# Patient Record
Sex: MALE | Race: WHITE | Employment: OTHER | URBAN - METROPOLITAN AREA
[De-identification: names, ages, dates, MRNs, and addresses within clinical notes are randomized per-mention and may not be internally consistent; named-entity substitution may affect disease eponyms.]

---

## 2020-11-24 ENCOUNTER — OFFICE VISIT (OUTPATIENT)
Dept: ORTHOPEDIC SURGERY | Age: 85
End: 2020-11-24
Payer: MEDICARE

## 2020-11-24 VITALS — WEIGHT: 176 LBS | HEIGHT: 70 IN | BODY MASS INDEX: 25.2 KG/M2

## 2020-11-24 PROCEDURE — 99203 OFFICE O/P NEW LOW 30 MIN: CPT | Performed by: PHYSICIAN ASSISTANT

## 2020-11-24 PROCEDURE — G8417 CALC BMI ABV UP PARAM F/U: HCPCS | Performed by: PHYSICIAN ASSISTANT

## 2020-11-24 PROCEDURE — 1123F ACP DISCUSS/DSCN MKR DOCD: CPT | Performed by: PHYSICIAN ASSISTANT

## 2020-11-24 PROCEDURE — 4040F PNEUMOC VAC/ADMIN/RCVD: CPT | Performed by: PHYSICIAN ASSISTANT

## 2020-11-24 PROCEDURE — G8427 DOCREV CUR MEDS BY ELIG CLIN: HCPCS | Performed by: PHYSICIAN ASSISTANT

## 2020-11-24 PROCEDURE — 1036F TOBACCO NON-USER: CPT | Performed by: PHYSICIAN ASSISTANT

## 2020-11-24 PROCEDURE — G8484 FLU IMMUNIZE NO ADMIN: HCPCS | Performed by: PHYSICIAN ASSISTANT

## 2020-11-24 NOTE — PROGRESS NOTES
NEW PATIENT VISIT      Referring Provider: [facility]    Primary Care Provider: Dr. Ashleigh Sands MD    CHIEF COMPLAINT    Left shoulder dislocation    HISTORY OF PRESENT ILLNESS    John Sullivan is a 80 y.o. male who presents for left shoulder dislocation. He comes to the office today with his caregiver from [facility]. He has Alzheimer's and is very confused. Not a good historian. He and his caregiver are unsure if he has had any recent falls. Upon calling the nursing home facility he apparently did have a fall in September. He reports that his shoulder is in pain. He comes in today with in the office in a wheelchair and wearing a sling. His caregiver reports that he does lean and slumped to that left side frequently. ROS    Review of Systems:  Francine Nguyen's review of systems has been performed by intake and observation. All past and current ROS forms have been scanned into the medical record. He has been instructed to contact his primary care provider regarding ROS issues if not already being addressed at this time. There are no recent changes.  The most recent ROS was scanned into media on 11/24/2020      PAST SURGICAL HISTORY    Past Surgical History:   Procedure Laterality Date    APPENDECTOMY      CAROTID ENDARTERECTOMY      COLONOSCOPY  2014    bx    OTHER SURGICAL HISTORY      FEMORAL STENT    NC OPEN CORONARY ENDARTERECTOMY      Angioplasty    TONSILLECTOMY      UPPER GASTROINTESTINAL ENDOSCOPY      bx       PAST MEDICAL    Past Medical History:   Diagnosis Date    [facility]     CAD (coronary artery disease)     Hyperlipidemia     Hypertension     [facility]ocardial infarction) [facility]     Parkinson's disease ([facility] 2014    Unspecified cerebral artery occlusion with cerebral infarction        ALLERGIES    No Known Allergies    MEDS    Current Outpatient Medications   Medication Sig Dispense Refill    lisinopril (PRINIVIL;ZESTRIL) 5 MG tablet Take 5 mg by mouth daily      metoprolol tartrate (LOPRESSOR) 25 MG tablet Take 25 mg by mouth 2 times daily      rivastigmine (EXELON) 3 MG capsule Take 3 mg by mouth 2 times daily      potassium chloride (MICRO-K) 10 MEQ extended release capsule Take 10 mEq by mouth daily      Saline (SIMPLY SALINE) 0.9 % AERS by Nasal route      oxybutynin (DITROPAN) 5 MG tablet Take 5 mg by mouth daily       tamsulosin (FLOMAX) 0.4 MG capsule       furosemide (LASIX) 20 MG tablet 20 mg 2 times daily       atorvastatin (LIPITOR) 20 MG tablet Take 40 mg by mouth daily       aspirin 81 MG chewable tablet Take 81 mg by mouth daily.  diphenhydrAMINE (ALLERGY RELIEF) 25 MG capsule Take 25 mg by mouth every 6 hours as needed for Itching.  IRON PO Take 65 mg by mouth 2 times daily.  Multiple Vitamin (MULTIVITAMIN PO) Take  by mouth.  GLUCOSAMINE-CHONDROITIN PO Take 2 tablets by mouth daily. No current facility-administered medications for this visit.         SOCIAL    Social History     Socioeconomic History    Marital status:      Spouse name: Not on file    Number of children: Not on file    Years of education: Not on file    Highest education level: Not on file   Occupational History    Not on file   Social Needs    Financial resource strain: Not on file    Food insecurity     Worry: Not on file     Inability: Not on file    Transportation needs     Medical: Not on file     Non-medical: Not on file   Tobacco Use    Smoking status: Former Smoker     Last attempt to quit: 10/1/1985     Years since quittin.1    Smokeless tobacco: Never Used   Substance and Sexual Activity    Alcohol use: No    Drug use: No    Sexual activity: Yes     Partners: Female   Lifestyle    Physical activity     Days per week: Not on file     Minutes per session: Not on file    Stress: Not on file   Relationships    Social connections     Talks on phone: Not on file Gets together: Not on file     Attends Latter day service: Not on file     Active member of club or organization: Not on file     Attends meetings of clubs or organizations: Not on file     Relationship status: Not on file    Intimate partner violence     Fear of current or ex partner: Not on file     Emotionally abused: Not on file     Physically abused: Not on file     Forced sexual activity: Not on file   Other Topics Concern    Not on file   Social History Narrative    Not on file       FAMILY HISTORY    Family History   Problem Relation Age of Onset    Heart Disease Father        PHYSICAL EXAM    Ht 5' 10\" (1.778 m)   Wt 176 lb (79.8 kg)   BMI 25.25 kg/m² [email or web address]   General:  Patient is alert and orientation to person place and time is age-appropriate. Gait:  Patient walks around the room and in the hallway with a normal gait and no limp. Balance and coordination are age-appropriate. Extremities: Anterior bulge is noted. Hand wrist and elbow appear to be in normal working condition. Shoulder range of motion was not tested today. Patient does have swelling noted throughout the shoulder. Skin:  Normal on back and bilateral upper and lower extremities. Spine:  No deformity. Neurological:  Normal motor and sensory exam in both upper and lower extremities. Vascular exam:  Normal in both upper and lower extremities. IMAGING STUDIES    2 views of the left shoulder show an anteriorly displaced shoulder. Office Procedures:  No orders of the defined types were placed in this encounter. IMPRESSION    Left shoulder anterior dislocation with likely large rotator cuff insufficiency     PLAN    At this time, we will plan on reaching out to his wife to discuss further treatment plans. He is not in the right mind to make those decisions today and his caregiver does not have that authority.               [facility]  A Partner of [facility] ([facility])      This

## 2020-12-01 ENCOUNTER — TELEPHONE (OUTPATIENT)
Dept: ORTHOPEDIC SURGERY | Age: 85
End: 2020-12-01

## 2020-12-01 NOTE — TELEPHONE ENCOUNTER
I thank you for checking on them. It sounds as if there are no complications. For that reason, it may be safest and best to continue with nonsurgical treatment. Perhaps, we can check in with them again in another 2 weeks to be sure he is stable and the family and care team are on board with that plan.

## 2020-12-01 NOTE — TELEPHONE ENCOUNTER
Spoke with Trae Prince at [facility]. She said patient was doing well, taking norco 1-2 times per day for pain. He is eating and drinking well with normal BM's. He is in his wheelchair currently and will be transferred to his recliner between meals. She also said he has been wearing his sling and doesn't have any other new changes. She states he does have limited ROM and OT  Is wanting to know how much ROM they can do. She also mentioned speaking to patients wife who said he had hip surgery 2+ years ago and his dementia declined greatly after that surgery and is concerned about getting his shoulder fixed for that reason.

## 2020-12-14 ENCOUNTER — TELEPHONE (OUTPATIENT)
Dept: ORTHOPEDIC SURGERY | Age: 85
End: 2020-12-14

## 2020-12-14 NOTE — TELEPHONE ENCOUNTER
Patients daughter Bharati Hernandez called to ask questions about Speedy's plan of care. I set up a time for Dr. Alyssa Tinoco to speak directly with Bharati Hernandez regarding his care. Called patients wife Sebastian Camacho, who is also patients power of  to make sure it was ok that Dr. Alyssa Tinoco speak to Bharati Hernandez, as we don't have a current HIPAA form on file. Sebastian Camacho said it was ok for Dr. Alyssa Tinoco to speak with daughter Bharati Hernandez. No further questions.

## 2020-12-14 NOTE — TELEPHONE ENCOUNTER
General Question     Subject: FATHER'S LT SHOULDER  Patient and /or Facility Request: Mitchell Nunez  Contact Number: [phone]

## 2020-12-15 ENCOUNTER — VIRTUAL VISIT (OUTPATIENT)
Dept: ORTHOPEDIC SURGERY | Age: 85
End: 2020-12-15

## 2020-12-15 PROCEDURE — 99999 PR OFFICE/OUTPT VISIT,PROCEDURE ONLY: CPT | Performed by: ORTHOPAEDIC SURGERY

## 2020-12-15 NOTE — PROGRESS NOTES
Telephone consultation at [facility] on December 15 with Fidel Puentes Mr. Vesta Corbett daughter. She had obtained permission from her mother, the power of , to have this consultation. We discussed the difficulty of this clinical scenario in which this 66-year-old patient with a history of cardiac disease and advanced dementia presented to us with a chronic, time undetermined locked anterior glenohumeral dislocation. Family had been extremely concerned about the possible risks and sequela of surgery. He appeared reasonably comfortable at the time with no swelling, ecchymosis or nerve changes. He had movement of the elbow and hand. The daughter, Fidel Puentes, reports that the patient does have pain with bathing and hygiene as well as getting dressed. She feels this has been affecting his quality of life activity as he is already wheelchair confined. We discussed potential reconstructive surgery. I feel given the chronicity of this location, the soft tissue insufficiency that the only reliable treatment would be a retentive reverse prosthesis. We discussed some literature regarding outcomes and complications rates. I was very open about the potential of cardiac complications or worsening of dementia.   Fidel Puentes, her siblings and her mother agreed to have a family meeting and discuss this at length to make a decision regarding conservative versus surgical intervention

## 2020-12-29 ENCOUNTER — OFFICE VISIT (OUTPATIENT)
Dept: ORTHOPEDIC SURGERY | Age: 85
End: 2020-12-29
Payer: MEDICARE

## 2020-12-29 VITALS — WEIGHT: 176 LBS | BODY MASS INDEX: 25.2 KG/M2 | HEIGHT: 70 IN

## 2020-12-29 PROCEDURE — G8427 DOCREV CUR MEDS BY ELIG CLIN: HCPCS | Performed by: ORTHOPAEDIC SURGERY

## 2020-12-29 PROCEDURE — 4040F PNEUMOC VAC/ADMIN/RCVD: CPT | Performed by: ORTHOPAEDIC SURGERY

## 2020-12-29 PROCEDURE — 99214 OFFICE O/P EST MOD 30 MIN: CPT | Performed by: ORTHOPAEDIC SURGERY

## 2020-12-29 PROCEDURE — 1036F TOBACCO NON-USER: CPT | Performed by: ORTHOPAEDIC SURGERY

## 2020-12-29 PROCEDURE — 1123F ACP DISCUSS/DSCN MKR DOCD: CPT | Performed by: ORTHOPAEDIC SURGERY

## 2020-12-29 PROCEDURE — L3670 SO ACRO/CLAV CAN WEB PRE OTS: HCPCS | Performed by: ORTHOPAEDIC SURGERY

## 2020-12-29 PROCEDURE — G8484 FLU IMMUNIZE NO ADMIN: HCPCS | Performed by: ORTHOPAEDIC SURGERY

## 2020-12-29 PROCEDURE — G8417 CALC BMI ABV UP PARAM F/U: HCPCS | Performed by: ORTHOPAEDIC SURGERY

## 2020-12-29 RX ORDER — BENZOYL PEROXIDE 50 MG/ML
LIQUID TOPICAL
Qty: 1 BOTTLE | Refills: 0 | Status: SHIPPED | OUTPATIENT
Start: 2020-12-29

## 2020-12-29 NOTE — PROGRESS NOTES
Department of Orthopedic Surgery   Progress Note      Follow-Up: Chronic locked anterior left shoulder dislocation    Subjective:     Breanna Rasmussen 80year-old gentleman with unfortunately some recent advancing dementia. He had presented previously with a chronic locked anterior dislocation. Unclear etiology at the nursing facility. He did have underlying rotator cuff arthropathy please posing him to this. We had multiple visits with both Speedy's wife as well as his family by telephone. They have been trialing conservative treatment with prescription disability however quite compromised by persistent pain. He seems uncomfortable has been requesting surgery. He has not seen his primary care doctor in over a year. He has a relevant history of a coronary artery event by his wife's report      Objective:     @IPVITALS(24)@    Review of Systems  Pertinent items are noted in HPI  Denies fever, chills, confusion, bowel/bladder active change. Review of systems reviewed from Patient History Form dated on December 29 and available in the patient's chart under the Media tab. Exam: On exam today he is seated in his wheelchair. He is clearly uncomfortable with any attempted movement of the left upper extremity. He has some mild lymphedema in the forearm but remains a supple hand. He has the soft tissue deformity anteriorly with tenting of the anterior deltoid. Imaging: Reviewed    Office Procedures:      Assessment:     Locked anterior glenohumeral dislocation 49-year-old gentleman in the setting of rotator cuff arthropathy and some moderate dementia.      Plan:

## 2020-12-29 NOTE — LETTER
Surgery Scheduling Form   [x] [phone]   [] [facility] Fax# [phone]  [] [facility] Fax# [phone]    Scheduled Date: 21      Scheduled Time: 7:30am      Time Needed: 90min        Patient Information:      Name: Beth Mcleod      YOB: 1935      SSN:         Gender:  male                            Address: [address] [place] [address]   Phone Number: [phone] (home) [phone] (work)    Insurance:  Payor: MEDICARE / Plan: MEDICARE PART A AND B / Product Type: *No Product type* /     Primary Care Physician:  Korina Sexton MD    H&P To Be Completed By: PCP    Cardiac Clearance Needed? [x] Yes [] No        Pacemaker/Cardiac Implant? [] Yes [x] No            Surgical Procedure: Left reverse total shoulder arthroplasty   CPT [id number]    Pre- Op Diagnosis:   1. Rotator cuff arthropathy of left shoulder    2. Dislocation of left shoulder joint, initial encounter            Diagnosis Code: M12.812, S43.005A    Rep: [facility]      Notified:  [x] Yes [] No        Special Equipment: [address] chair      [] Mini C-ARM   [] Large  C-ARM       Patient Status:   [] Outpatient         [x] Same Day Admission      [] In- Patient              Anesthesia Type:    [x] General         [] MAC       [] IV Regional          [] Local          [x] Interscalene block    [] Axillary Block             [] Popliteal Block     Allergies:  No Known Allergies      Latex: [] Yes [x] No      Pre-Admission Testing  Total Joint Orders for Manuela Rdoriguez M.D   NAME  Beth Mcleod               1935      Vitals:    20 1126   Weight: 176 lb (79.8 kg)   Height: 5' 10\" (1.778 m)          Edwina [address]. ALL OTHER ORDERS WILL BE AUTOMATICALLY INITIATED.         1. [x]  Attend PAT 1-3 weeks prior to OR date            [x]  Attend Joint Class prior to surgery (If applicable) 2. PT/ OT evaluation    3. MEDICATIONS:  Identify medications to take or hold day of surgery    4. DIAGNOSTICS:   ** note: if these have been done within 90 days, they do not have to be repeated**  [x]  APTT    [x] PT/INR    [x] BMP    [x] Albumin    [x] Type & Screen         [x] Hgb A1    [x]  CBC w/ diff    [x] Transferrin Level    [x] EKG (60 or older, or cardiac history)    [x] UA/urine culture and sensitivity       5 EDUCATION:     [x]Instruct patient to wash with Benzoyl Peroxide wash. Wash 2 days prior, the day before and the day of surgery to surgical shoulder, neck and armpit. (Patient given instructions)  [x]Patient treated with Bactroban nasal ointment for 5 days prior to surgery twice daily. 6. DISCHARGE PLANNING:  Notify Social Work of potential needs including equipment. SAME DAY SURGERY ORDERS:    7. [x] Repeat PT/INR/PTT for any patient taking or recently stopped taking preop anticoagulants     8. Patient to bring any bracing/sling day of surgery    9.. Antibiotic:    [x]Ancef  IVPB per weight base protocol prior to surgery. Ancef 2 grams if less than or equal to 119.9 kg (263 lbs). Ancef 3 grams if greater than or equal to 120 kg ( 264 lbs ). Pediatric patients (less than 40 kg / 90 lbs) Ancef 30 mg/kg. If allergic to PCN or Cephalosporins give:    Clindamycin 900 mg IVPB          If allergic to PCN or Cephalosporins, MRSA positive, or over age 72 give. Vancomycin 1 gram IVPB if 176 lbs ( 80 kg ) or less. Vancomycin 1.5 grams  IVPB if 177-264 lbs ( 81 - 120 kg ). Vancomycin 2 grams IVPB if over 264 lbs ( over 120 kg ). If allergic to Cephalosporins,PCN and Vancomycin give Cleocin 900 mg IVPB.         10. Medications: [x] Acetaminophen 1000 mg by mouth 1 hour prior to procedure   [x] Celebrex 400 mg by mouth 1 hour prior to procedure      Date/Time: 12/31/20 9:42 AM  Physicians signature:        Scheduled By:  Jasper Rios 12/31/20 9:42 AM Direct Tel: [phone] Direct Fax: [phone]

## 2020-12-29 NOTE — LETTER
[facility]   Surgery Precert & Billing Form:    DEMOGRAPHICS:                                                                                                       Patient Name:  Maye Maier  Patient :  1935   Patient SS#:      Patient Phone:  [phone] (home) [phone] (work) Alt. Patient Phone:    Patient Address:  [address] [place] [address]    PCP:  Joyce Gabriel MD  Insurance: Payor: MEDICARE / Plan: MEDICARE PART A AND B / Product Type: *No Product type* /     DIAGNOSIS & PROCEDURE:                                                                                      Diagnosis:   1. Rotator cuff arthropathy of left shoulder    2.  Dislocation of left shoulder joint, initial encounter      Diagnosis Code:  M12.812, F02.997R      Operation: left    SURGERY  INFORMATION  Date of Surgery:   21  Location:  [facility]    Type:    Inpatient  23 hour hold:  No  Surgeon:              Wellington Gruber MD      20     BILLING INFORMATION:                                                                                                Physician Procedure                                            CPT Codes        Left reverse total shoulder arthroplasty  [id number]                      PA, or Fellow Procedure                                      CPT Codes                     Precert information:

## 2021-01-14 ENCOUNTER — TELEPHONE (OUTPATIENT)
Dept: ORTHOPEDIC SURGERY | Age: 86
End: 2021-01-14

## 2021-01-14 NOTE — TELEPHONE ENCOUNTER
Auth: # NPR    Date: Left reverse total shoulder arthroplasty  Type of SX:  IP  Location: [facility]  CPT: 24335   DX Code: B75.653   U19.913I  SX area: LEft reverse total shoulder arthroplasty  Insurance: Medicare